# Patient Record
Sex: FEMALE | Race: WHITE | NOT HISPANIC OR LATINO | Employment: OTHER | URBAN - METROPOLITAN AREA
[De-identification: names, ages, dates, MRNs, and addresses within clinical notes are randomized per-mention and may not be internally consistent; named-entity substitution may affect disease eponyms.]

---

## 2017-09-19 ENCOUNTER — HOSPITAL ENCOUNTER (OUTPATIENT)
Dept: LAB | Facility: CLINIC | Age: 67
Discharge: HOME | End: 2017-09-19
Attending: FAMILY MEDICINE | Admitting: FAMILY MEDICINE
Payer: COMMERCIAL

## 2017-09-19 DIAGNOSIS — F41.9 ANXIETY: ICD-10-CM

## 2017-09-19 DIAGNOSIS — R53.83 FATIGUE: ICD-10-CM

## 2017-09-19 DIAGNOSIS — R10.9 STOMACH ACHE: Primary | ICD-10-CM

## 2017-09-19 DIAGNOSIS — K90.0 CELIAC DISEASE: ICD-10-CM

## 2017-09-19 DIAGNOSIS — R19.7 DIARRHEA: ICD-10-CM

## 2017-09-19 LAB
ALBUMIN SERPL-MCNC: 3.9 G/DL (ref 3.4–5)
ALP SERPL-CCNC: 68 U/L (ref 40–150)
ALT SERPL W P-5'-P-CCNC: 22 U/L (ref 0–50)
ANION GAP SERPL CALCULATED.3IONS-SCNC: 7 MMOL/L (ref 3–14)
AST SERPL W P-5'-P-CCNC: 19 U/L (ref 0–45)
BILIRUB SERPL-MCNC: 0.5 MG/DL (ref 0.2–1.3)
BUN SERPL-MCNC: 12 MG/DL (ref 7–30)
CALCIUM SERPL-MCNC: 9.1 MG/DL (ref 8.5–10.1)
CHLORIDE SERPL-SCNC: 107 MMOL/L (ref 94–109)
CO2 SERPL-SCNC: 27 MMOL/L (ref 20–32)
CREAT SERPL-MCNC: 0.71 MG/DL (ref 0.52–1.04)
CRP SERPL-MCNC: <2.9 MG/L (ref 0–8)
GFR SERPL CREATININE-BSD FRML MDRD: 81 ML/MIN/1.7M2
GGT SERPL-CCNC: 13 U/L (ref 0–40)
GLUCOSE SERPL-MCNC: 103 MG/DL (ref 70–99)
POTASSIUM SERPL-SCNC: 3.9 MMOL/L (ref 3.4–5.3)
PROT SERPL-MCNC: 8 G/DL (ref 6.8–8.8)
SODIUM SERPL-SCNC: 141 MMOL/L (ref 133–144)

## 2017-09-19 PROCEDURE — 36415 COLL VENOUS BLD VENIPUNCTURE: CPT | Performed by: FAMILY MEDICINE

## 2017-09-19 PROCEDURE — 82977 ASSAY OF GGT: CPT | Performed by: FAMILY MEDICINE

## 2017-09-19 PROCEDURE — 80053 COMPREHEN METABOLIC PANEL: CPT | Performed by: FAMILY MEDICINE

## 2017-09-19 PROCEDURE — 86140 C-REACTIVE PROTEIN: CPT | Performed by: FAMILY MEDICINE

## 2017-09-19 PROCEDURE — 83018 HEAVY METAL QUAN EACH NES: CPT | Performed by: FAMILY MEDICINE

## 2017-09-20 LAB — IODINE SERPL-MCNC: 61 NG/ML (ref 40–92)

## 2018-02-06 ENCOUNTER — SURGERY (OUTPATIENT)
Age: 68
End: 2018-02-06

## 2018-02-06 ENCOUNTER — HOSPITAL ENCOUNTER (OUTPATIENT)
Facility: CLINIC | Age: 68
Discharge: HOME | End: 2018-02-06
Attending: OPHTHALMOLOGY | Admitting: OPHTHALMOLOGY
Payer: COMMERCIAL

## 2018-02-06 ENCOUNTER — HOSPITAL ENCOUNTER (EMERGENCY)
Facility: CLINIC | Age: 68
Discharge: HOME | End: 2018-02-06
Attending: EMERGENCY MEDICINE | Admitting: EMERGENCY MEDICINE
Payer: COMMERCIAL

## 2018-02-06 VITALS
WEIGHT: 132 LBS | TEMPERATURE: 97.9 F | RESPIRATION RATE: 16 BRPM | HEIGHT: 65 IN | SYSTOLIC BLOOD PRESSURE: 149 MMHG | DIASTOLIC BLOOD PRESSURE: 76 MMHG | BODY MASS INDEX: 21.99 KG/M2 | OXYGEN SATURATION: 98 %

## 2018-02-06 DIAGNOSIS — H40.032 ANATOMICAL NARROW ANGLE BORDERLINE GLAUCOMA OF LEFT EYE: Primary | ICD-10-CM

## 2018-02-06 DIAGNOSIS — H40.211 ACUTE ANGLE-CLOSURE GLAUCOMA, RIGHT: Primary | ICD-10-CM

## 2018-02-06 DIAGNOSIS — H40.9 ACUTE GLAUCOMA OF RIGHT EYE: ICD-10-CM

## 2018-02-06 PROCEDURE — 25000128 H RX IP 250 OP 636: Performed by: EMERGENCY MEDICINE

## 2018-02-06 PROCEDURE — 36000104 ZZH EYE SURGERY LEVEL 4 1ST 30 MIN: Performed by: OPHTHALMOLOGY

## 2018-02-06 PROCEDURE — 96374 THER/PROPH/DIAG INJ IV PUSH: CPT

## 2018-02-06 PROCEDURE — 99284 EMERGENCY DEPT VISIT MOD MDM: CPT | Mod: 25

## 2018-02-06 PROCEDURE — 25000125 ZZHC RX 250: Performed by: OPHTHALMOLOGY

## 2018-02-06 PROCEDURE — 96375 TX/PRO/DX INJ NEW DRUG ADDON: CPT

## 2018-02-06 PROCEDURE — 40000169 ZZH STATISTIC PRE-PROCEDURE ASSESSMENT I: Performed by: OPHTHALMOLOGY

## 2018-02-06 PROCEDURE — 25000125 ZZHC RX 250: Performed by: EMERGENCY MEDICINE

## 2018-02-06 RX ORDER — PROPARACAINE HYDROCHLORIDE 5 MG/ML
2 SOLUTION/ DROPS OPHTHALMIC ONCE
Status: COMPLETED | OUTPATIENT
Start: 2018-02-06 | End: 2018-02-06

## 2018-02-06 RX ORDER — TIMOLOL MALEATE 5 MG/ML
1 SOLUTION/ DROPS OPHTHALMIC ONCE
Status: COMPLETED | OUTPATIENT
Start: 2018-02-06 | End: 2018-02-06

## 2018-02-06 RX ORDER — ONDANSETRON 2 MG/ML
4 INJECTION INTRAMUSCULAR; INTRAVENOUS ONCE
Status: COMPLETED | OUTPATIENT
Start: 2018-02-06 | End: 2018-02-06

## 2018-02-06 RX ORDER — PREDNISOLONE ACETATE 10 MG/ML
1 SUSPENSION/ DROPS OPHTHALMIC 4 TIMES DAILY
Qty: 1 BOTTLE | Refills: 0 | Status: SHIPPED | OUTPATIENT
Start: 2018-02-06 | End: 2022-08-01

## 2018-02-06 RX ORDER — PILOCARPINE HYDROCHLORIDE 20 MG/ML
1 SOLUTION/ DROPS OPHTHALMIC ONCE
Status: DISCONTINUED | OUTPATIENT
Start: 2018-02-06 | End: 2018-02-07 | Stop reason: HOSPADM

## 2018-02-06 RX ORDER — WATER 10 ML/10ML
INJECTION INTRAMUSCULAR; INTRAVENOUS; SUBCUTANEOUS
Status: DISCONTINUED
Start: 2018-02-06 | End: 2018-02-06 | Stop reason: HOSPADM

## 2018-02-06 RX ORDER — BRIMONIDINE TARTRATE 1 MG/ML
1 SOLUTION/ DROPS OPHTHALMIC 2 TIMES DAILY
Qty: 1 BOTTLE | Refills: 11 | Status: SHIPPED | OUTPATIENT
Start: 2018-02-06 | End: 2022-07-24

## 2018-02-06 RX ORDER — BRIMONIDINE TARTRATE 2 MG/ML
SOLUTION/ DROPS OPHTHALMIC PRN
Status: DISCONTINUED | OUTPATIENT
Start: 2018-02-06 | End: 2018-02-07 | Stop reason: HOSPADM

## 2018-02-06 RX ORDER — PROPARACAINE HYDROCHLORIDE 5 MG/ML
1 SOLUTION/ DROPS OPHTHALMIC ONCE
Status: DISCONTINUED | OUTPATIENT
Start: 2018-02-06 | End: 2018-02-07 | Stop reason: HOSPADM

## 2018-02-06 RX ORDER — ACETAZOLAMIDE 500 MG/5ML
500 INJECTION, POWDER, LYOPHILIZED, FOR SOLUTION INTRAVENOUS ONCE
Status: COMPLETED | OUTPATIENT
Start: 2018-02-06 | End: 2018-02-06

## 2018-02-06 RX ORDER — PILOCARPINE HYDROCHLORIDE 20 MG/ML
1 SOLUTION/ DROPS OPHTHALMIC ONCE
Status: COMPLETED | OUTPATIENT
Start: 2018-02-06 | End: 2018-02-06

## 2018-02-06 RX ADMIN — PILOCARPINE HYDROCHLORIDE 1 DROP: 20 SOLUTION/ DROPS OPHTHALMIC at 08:59

## 2018-02-06 RX ADMIN — ACETAZOLAMIDE 500 MG: 500 INJECTION, POWDER, LYOPHILIZED, FOR SOLUTION INTRAVENOUS at 09:04

## 2018-02-06 RX ADMIN — BRIMONIDINE TARTRATE 1 DROP: 2 SOLUTION/ DROPS OPHTHALMIC at 13:05

## 2018-02-06 RX ADMIN — APRACLONIDINE HYDROCHLORIDE 1 DROP: 10 SOLUTION/ DROPS OPHTHALMIC at 08:57

## 2018-02-06 RX ADMIN — PROPARACAINE HYDROCHLORIDE 2 DROP: 5 SOLUTION/ DROPS OPHTHALMIC at 08:29

## 2018-02-06 RX ADMIN — PILOCARPINE HYDROCHLORIDE 1 DROP: 20 SOLUTION/ DROPS OPHTHALMIC at 09:16

## 2018-02-06 RX ADMIN — TIMOLOL MALEATE 1 DROP: 5 SOLUTION OPHTHALMIC at 08:55

## 2018-02-06 RX ADMIN — ONDANSETRON 4 MG: 2 INJECTION INTRAMUSCULAR; INTRAVENOUS at 09:08

## 2018-02-06 ASSESSMENT — ENCOUNTER SYMPTOMS
CHILLS: 0
FEVER: 0
NAUSEA: 0
DIZZINESS: 0
COUGH: 0
NUMBNESS: 0
FATIGUE: 0
PHOTOPHOBIA: 0
LIGHT-HEADEDNESS: 0
SEIZURES: 0
WEAKNESS: 0
HEADACHES: 1
SHORTNESS OF BREATH: 0
SPEECH DIFFICULTY: 0

## 2018-02-06 ASSESSMENT — VISUAL ACUITY
OD: 20/50
OS: 20/30

## 2018-02-06 NOTE — DISCHARGE SUMMARY
Patient presented to ED with pressure of 82 and an acute angle in the right eye.  After IV Diamox, and several rounds of IOP lowering drops, the IOP reduced to 18.  She has not prior history of reported angle closure or of any prior surgery in either eye.  It was determined that she should have an laser peripheral iridotomy (LPI) OD now and likely an laser peripheral iridotomy (LPI) OS in the future  Risks/benefits/alternatives discussed  Patient wishes to proceed with laser peripheral iridotomy (LPI) OD now    laser peripheral iridotomy (LPI) performed right eye    43 shots  1.8 mJ  74 mJ total energy    Start prednisolone FOUR TIMES A DAY x 1 week, twice a day x 1 week, once a day x 1 week  Start Brimonidine twice a day x 1 week  Start Timolol every morning x 1 week    Patient to follow up here at [facility] for exam of right eye and likely laser peripheral iridotomy (LPI) OS

## 2018-02-06 NOTE — ED AVS SNAPSHOT
Emergency Department    [address] [place] [address]    Phone:  [phone]    Fax:  [phone]                                       Macie Maya   MRN: [id number]    Department:   Emergency Department   Date of Visit:  2/6/2018           After Visit Summary Signature Page     I have received my discharge instructions, and my questions have been answered. I have discussed any challenges I see with this plan with the nurse or doctor.    ..........................................................................................................................................  Patient/Patient Representative Signature      ..........................................................................................................................................  Patient Representative Print Name and Relationship to Patient    ..................................................               ................................................  Date                                            Time    ..........................................................................................................................................  Reviewed by Signature/Title    ...................................................              ..............................................  Date                                                            Time

## 2018-02-06 NOTE — ED AVS SNAPSHOT
Emergency Department    [address] [facility] [address]    Phone:  [phone]    Fax:  [phone]                                       Macie Maya   MRN: [id number]    Department:   Emergency Department   Date of Visit:  2/6/2018           Patient Information     Date Of Birth          1950        Your diagnoses for this visit were:     Acute glaucoma of right eye        You were seen by Jami Menezes MD.      Follow-up Information     Follow up with Jose Cleveland MD.    Specialty:  Ophthalmology    Why:  [facility] in the [facility] for [facility]    Contact information:    [address] [facility] [address] [phone]        Discharge References/Attachments     GLAUCOMA, NARROW-ANGLE (ACUTE) (ENGLISH)      24 Hour Appointment Hotline       To make an appointment at any [facility], call [phone] ([phone]). If you don't have a family doctor or clinic, we will help you find one. [facility] are conveniently located to serve the needs of you and your family.             Review of your medicines      Notice     You have not been prescribed any medications.            Procedures and tests performed during your visit     Visual Acuity      Orders Needing Specimen Collection     None      Pending Results     No orders found from 2/4/2018 to 2/7/2018.            Pending Culture Results     No orders found from 2/4/2018 to 2/7/2018.            Pending Results Instructions     If you had any lab results that were not finalized at the time of your Discharge, you can call the ED Lab Result RN at [phone]. You will be contacted by this team for any positive Lab results or changes in treatment. The nurses are available 7 days a week from 10A to 6:30P.  You can leave a message 24 hours per day and they will return your call.        Test Results From Your Hospital Stay               Clinical Quality Measure: Blood Pressure Screening     Your  blood pressure was checked while you were in the emergency department today. The last reading we obtained was  BP: 149/76 (Simultaneous filing. User may not have seen previous data.) . Please read the guidelines below about what these numbers mean and what you should do about them.  If your systolic blood pressure (the top number) is less than 120 and your diastolic blood pressure (the bottom number) is less than 80, then your blood pressure is normal. There is nothing more that you need to do about it.  If your systolic blood pressure (the top number) is 120-139 or your diastolic blood pressure (the bottom number) is 80-89, your blood pressure may be higher than it should be. You should have your blood pressure rechecked within a year by a primary care provider.  If your systolic blood pressure (the top number) is 140 or greater or your diastolic blood pressure (the bottom number) is 90 or greater, you may have high blood pressure. High blood pressure is treatable, but if left untreated over time it can put you at risk for heart attack, stroke, or kidney failure. You should have your blood pressure rechecked by a primary care provider within the next 4 weeks.  If your provider in the emergency department today gave you specific instructions to follow-up with your doctor or provider even sooner than that, you should follow that instruction and not wait for up to 4 weeks for your follow-up visit.        Thank you for choosing [facility]       Thank you for choosing [facility] for your care. Our goal is always to provide you with excellent care. Hearing back from our patients is one way we can continue to improve our services. Please take a few minutes to complete the written survey that you may receive in the mail after you visit with us. Thank you!        [facility] Information     [facility] lets you send messages to your doctor, view your test results, renew your prescriptions, schedule appointments and more. To sign up, go  "to [email or web address] . Click on \"Log in\" on the left side of the screen, which will take you to the Welcome page. Then click on \"Sign up Now\" on the right side of the page.     You will be asked to enter the access code listed below, as well as some personal information. Please follow the directions to create your username and password.     Your access code is: [email or web address]  Expires: 2018 10:52 AM     Your access code will  in 90 days. If you need help or a new code, please call your [facility] clinic or [phone].        Care EveryWhere ID     This is your Care EveryWhere ID. This could be used by other organizations to access your [facility] medical records  [id number]        Equal Access to Services     ESTUARDO BE : Jesús Cordova, wavipul griffin, peyton valeraalsheela headley, twan hernández. So [facility] [phone].    ATENCIÓN: Si habla español, tiene a barrera disposición servicios gratuitos de asistencia lingüística. Jayla al [phone].    We comply with applicable federal civil rights laws and [place] laws. We do not discriminate on the basis of race, color, national origin, age, disability, sex, sexual orientation, or gender identity.            After Visit Summary       This is your record. Keep this with you and show to your community pharmacist(s) and doctor(s) at your next visit.                  "

## 2018-02-06 NOTE — OP NOTE
PROCEDURE NOTE:    Patient Name: Macie Maya  Date: February 6, 2018    Pre-op Dx:   1. Acute angle closure glaucoma OD  2. Anatomic narrow angles OU  Post-op Dx: Same    Procedure: Emergent laser peripheral iridotomy OD  Surgeon: Jose Medina MD  Assistant: Pernell Moreno DO    Indications:  The patient is a 68yo  Female with a history of cataracts and anatomic narrow angles. She presented to the ER with severe ocular pain OD acutely, associated with headache, nausea, blurry vision. She was diagnosed with acute angle closure glaucoma and started on Diamox IV and topical glaucoma drops. Her pressure improved and she was urgently sent to the eye center for immediate placement of a laser peripheral iridotomy in the right eye. The risks, benefits, and alternatives of the procedure were discussed extensively with the patient. Informed consent was obtained.     Procedure:  After discussion of the risks, benefits, and alternatives of the procedure, informed consent was obtained. The appropriate eye was marked and the patient was brought to the laser room and placed in an appropriate position at the YAG laser. Several drops of topical tetracaine were placed in the right eye to numb the eye. Goniosol was then placed in a Sreedhar iridotomy lens and the lens was placed on the right eye. A peripheral iridotomy was created at ~9:30 with the YAG laser without complications. The laser settings are listed below.     Power: 1.7-2.0mJ  Total shots: 43  Total power: 72.3mJ  Post-op IOP: 9 mmHg    The patient tolerated the procedure well. She was discharged home and instructed to start prednisone QID OD x 1week, BID x 1week, daily x 1week, then STOP. She was also started on Brimonidine BID OD x 1week and Timolol qAM x 1week. The patient was instructed to follow-up in 1 month for LPI OS.    ~~~~~~~~~~~~~~~~~~~~~~~~~~~~~~~~~~~~~~~~~~~~~~~~~~~~~~~~~~~~~~~~    Complete documentation of historical and exam elements from  today's encounter can be found in the full encounter summary report (not reduplicated in this progress note). I personally obtained the chief complaint(s) and history of present illness.  I confirmed and edited as necessary the review of systems, past medical/surgical history, family history, social history, and examination findings as documented by others; and I examined the patient myself. I personally reviewed the relevant tests, images, and reports as documented above. I formulated and edited as necessary the assessment and plan and discussed the findings and management plan with the patient and family.    I was present for the entire procedure.    Jose Medina MD

## 2018-02-06 NOTE — ED PROVIDER NOTES
History     Chief Complaint:  Eye Problem    HPI   Macie Maya is a 67 year old female who presents with an eye problem. The patient states that she woke up this morning at approximately 0530 and started experiencing visual disturbances in her right eye. She states that the vision in her right eye has become blurry/cloudy and that her peripheral vision in this eye has decreased somewhat/become distorted. She states that her vision in her left eye is normal and unchanged. She is also having some right eye pain along with possible sinus pain/pressure. She is concerned for possible glaucoma and has no history of strokes or any other significant pathologies. She denies any cough, fevers, chills, numbness, weakness, facial droop, speech complications, chest pain, shortness of breath, or any other symptoms.    Allergies:  Gluten Meal     Medications:    The patient is not currently taking any prescribed medications.     Past Medical History:    Celiac disease    Past Surgical History:    No pertinent past surgical history.    Family History:    Mother-hypertension  Grandmother-stroke    Social History:  Smoking status: Never smoker  Alcohol use: No  Marital Status:        Review of Systems   Constitutional: Negative for chills, fatigue and fever.   Eyes: Positive for visual disturbance. Negative for photophobia.   Respiratory: Negative for cough and shortness of breath.    Gastrointestinal: Negative for nausea.   Neurological: Positive for headaches. Negative for dizziness, seizures, syncope, speech difficulty, weakness, light-headedness and numbness.   All other systems reviewed and are negative.    Physical Exam     Patient Vitals for the past 24 hrs:   BP Temp Temp src Heart Rate Resp SpO2 Height Weight   02/06/18 0916 - - - - - 98 % - -   02/06/18 0915 149/76 - - - - - - -   02/06/18 0900 - - - - - 100 % - -   02/06/18 0859 140/69 - - - - - - -   02/06/18 0843 - - - - - 99 % - -   02/06/18 0842 - - -  "- - 99 % - -   02/06/18 0841 139/74 - - - - - - -   02/06/18 0803 (!) 173/96 - - - - (!) 69 % - -   02/06/18 0800 172/89 97.9  F (36.6  C) Oral 102 16 99 % 1.651 m (5' 5\") 59.9 kg (132 lb)       Visual Acuity-Left: 20/30  Visual Acuity-Right: 20/50      Physical Exam  Nursing note and vitals reviewed.  Constitutional:  Appears well-developed and well-nourished.   HENT:   Head:    Atraumatic.   Mouth/Throat:   Oropharynx is clear and moist. No oropharyngeal exudate.   Eyes:    Pupils are equal, round, and reactive to light.  Unable to well visualize fundi.  Normal visual field testing bilateral eyes.  Neck:    Normal range of motion. Neck supple.      No tracheal deviation present. No thyromegaly present.   Cardiovascular:  Normal rate, regular rhythm, no murmur   Pulmonary/Chest: Breath sounds are clear and equal without wheezes or crackles.  Abdominal:   Soft. Bowel sounds are normal. Exhibits no distension and      no mass. There is no tenderness.      There is no rebound and no guarding.   Musculoskeletal:  Exhibits no edema.   Lymphadenopathy:  No cervical adenopathy.   Neurological:   GCS 15.  CN 2-12 intact.  and proximal upper extremity strength strong and equal.  Bilateral lower extremity strength strong and equal, including strong dorsiflexion and plantarflexion strength.  Sensation intact and equal to the face, arms and legs.  No facial droop or weakness. Normal speech.  Follows commands and answers questions normally.    Skin:    Skin is warm and dry. No rash noted. No pallor.     Emergency Department Course   Interventions:  (0829) Proparacaine 0.5% ophthalmic solution, 2 drops  (0855) Timolol 0.5% ophthalmic solution, 1 drop  (0857) Apraclonidine 1% ophthalmic solution, 1 drop  (0904) Acetazolamide 500 mg, IV Injection  (0908) Zofran, 4 mg, IV injection  (0916) Pilocarpine 2% ophthalmic solution, 2 drops    Emergency Department Course:  Nursing notes and vitals reviewed.  (0815) I performed an exam " of the patient as documented above.    Patient was taken to eye [place] for further examination and treatment by Dr. Moreno of ophthalmology.     I spoke once more with Dr. Cleveland about the patient's exam and the patient was found to have acute right eye glaucoma. Patient will be discharged from the ED into immediate care of the eye center for surgery later today to be performed by one of his partners, Dr. Medina, in tandem with Dr. Moreno.    Findings and plan explained to the patient. Patient discharged home with instructions regarding supportive care, medications, and reasons to return. The importance of close follow-up was reviewed.  Impression & Plan    Medical Decision Making:  Macie Maya is a 67 year old female who presented with an eye problem. I found the patient to have acute near angle glaucoma as the cause for her symptoms. Therefore I initiated treatment with eye drops topically and acetazolamide IV. I called ophthalmology initially but there was delay in them returning my calls so I had to initiate treatment emergently because the patient stated that her vision changes were starting to worsen. She was given topical eye drops as stated above and acetazolamide 500 mg IV and had improvement of her vision and eye pressure down to 64 from the initial 82. I eventually spoke with Dr. Demetrio Cleveland, the ophthalmologist on call, and just prior to that, Dr. Wade Moreno agreed to let me send the patient over to the eye center next door for them to evaluate her. He reported that her eye pressures had returned to normal and recommended PI surgery. I spoke with Dr. Jose Cleveland again and arranged for one of his partners, Dr. Medina, and the ophthalmology fellow, Dr. Moreno, to perform the surgery emergently here at [facility] in the eye [place]. The patient was discharged at the eye center so the patient could have her surgery. I also discussed with the patient her high blood pressure here. She states she  has never known about having hypertension and has never been officially diagnosed with this. She is advised to follow up with clinic within the next week to have her blood pressure rechecked. This was discussed with her and her  and they were alda agreement. I did not feel that there was any sign of stroke or intracranial bleed.    Diagnosis:    ICD-10-CM   1. Acute glaucoma of right eye H40.9       Disposition:  Patient is discharged to home.            I, Rosendo Noble, am serving as a scribe on 2/6/2018 at 8:15 AM to personally document services performed by Dr. Menezes based on my observations and the provider's statements to me.          Rosendo Noble  2/6/2018    EMERGENCY DEPARTMENT       Jami Menezes MD  02/06/18 8004       Jami Menezes MD  02/06/18 8379

## 2018-02-08 ENCOUNTER — TELEPHONE (OUTPATIENT)
Dept: OPHTHALMOLOGY | Facility: CLINIC | Age: 68
End: 2018-02-08

## 2018-02-09 ENCOUNTER — TELEPHONE (OUTPATIENT)
Dept: OPHTHALMOLOGY | Facility: CLINIC | Age: 68
End: 2018-02-09

## 2018-03-06 ENCOUNTER — SURGERY (OUTPATIENT)
Age: 68
End: 2018-03-06

## 2018-03-06 ENCOUNTER — HOSPITAL ENCOUNTER (OUTPATIENT)
Facility: CLINIC | Age: 68
Discharge: HOME | End: 2018-03-06
Attending: OPHTHALMOLOGY | Admitting: OPHTHALMOLOGY
Payer: COMMERCIAL

## 2018-03-06 ENCOUNTER — HOSPITAL ENCOUNTER (EMERGENCY)
Facility: CLINIC | Age: 68
Discharge: HOME | End: 2018-03-06
Attending: EMERGENCY MEDICINE | Admitting: EMERGENCY MEDICINE
Payer: COMMERCIAL

## 2018-03-06 VITALS
OXYGEN SATURATION: 98 % | SYSTOLIC BLOOD PRESSURE: 154 MMHG | DIASTOLIC BLOOD PRESSURE: 86 MMHG | RESPIRATION RATE: 16 BRPM | TEMPERATURE: 97.6 F

## 2018-03-06 VITALS
BODY MASS INDEX: 23.05 KG/M2 | TEMPERATURE: 97.9 F | DIASTOLIC BLOOD PRESSURE: 76 MMHG | WEIGHT: 135 LBS | SYSTOLIC BLOOD PRESSURE: 130 MMHG | OXYGEN SATURATION: 99 % | RESPIRATION RATE: 16 BRPM | HEIGHT: 64 IN | HEART RATE: 64 BPM

## 2018-03-06 DIAGNOSIS — H40.032 ANATOMICAL NARROW ANGLE BORDERLINE GLAUCOMA, LEFT: Primary | ICD-10-CM

## 2018-03-06 DIAGNOSIS — R55 VASOVAGAL SYNCOPE: ICD-10-CM

## 2018-03-06 LAB
ALBUMIN SERPL-MCNC: 3.9 G/DL (ref 3.4–5)
ALP SERPL-CCNC: 63 U/L (ref 40–150)
ALT SERPL W P-5'-P-CCNC: 22 U/L (ref 0–50)
ANION GAP SERPL CALCULATED.3IONS-SCNC: 9 MMOL/L (ref 3–14)
AST SERPL W P-5'-P-CCNC: 16 U/L (ref 0–45)
BASOPHILS # BLD AUTO: 0 10E9/L (ref 0–0.2)
BASOPHILS NFR BLD AUTO: 0.6 %
BILIRUB SERPL-MCNC: 0.4 MG/DL (ref 0.2–1.3)
BUN SERPL-MCNC: 22 MG/DL (ref 7–30)
CALCIUM SERPL-MCNC: 8.7 MG/DL (ref 8.5–10.1)
CHLORIDE SERPL-SCNC: 108 MMOL/L (ref 94–109)
CO2 SERPL-SCNC: 23 MMOL/L (ref 20–32)
CREAT SERPL-MCNC: 0.76 MG/DL (ref 0.52–1.04)
DIFFERENTIAL METHOD BLD: NORMAL
EOSINOPHIL # BLD AUTO: 0.1 10E9/L (ref 0–0.7)
EOSINOPHIL NFR BLD AUTO: 0.8 %
ERYTHROCYTE [DISTWIDTH] IN BLOOD BY AUTOMATED COUNT: 13.1 % (ref 10–15)
GFR SERPL CREATININE-BSD FRML MDRD: 75 ML/MIN/1.7M2
GLUCOSE SERPL-MCNC: 126 MG/DL (ref 70–99)
HCT VFR BLD AUTO: 35.3 % (ref 35–47)
HGB BLD-MCNC: 12.1 G/DL (ref 11.7–15.7)
IMM GRANULOCYTES # BLD: 0 10E9/L (ref 0–0.4)
IMM GRANULOCYTES NFR BLD: 0.5 %
INTERPRETATION ECG - MUSE: NORMAL
LYMPHOCYTES # BLD AUTO: 2.7 10E9/L (ref 0.8–5.3)
LYMPHOCYTES NFR BLD AUTO: 42.2 %
MCH RBC QN AUTO: 30.4 PG (ref 26.5–33)
MCHC RBC AUTO-ENTMCNC: 34.3 G/DL (ref 31.5–36.5)
MCV RBC AUTO: 89 FL (ref 78–100)
MONOCYTES # BLD AUTO: 0.4 10E9/L (ref 0–1.3)
MONOCYTES NFR BLD AUTO: 6 %
NEUTROPHILS # BLD AUTO: 3.3 10E9/L (ref 1.6–8.3)
NEUTROPHILS NFR BLD AUTO: 49.9 %
NRBC # BLD AUTO: 0 10*3/UL
NRBC BLD AUTO-RTO: 0 /100
PLATELET # BLD AUTO: 232 10E9/L (ref 150–450)
POTASSIUM SERPL-SCNC: 3.8 MMOL/L (ref 3.4–5.3)
PROT SERPL-MCNC: 7.6 G/DL (ref 6.8–8.8)
RBC # BLD AUTO: 3.98 10E12/L (ref 3.8–5.2)
SODIUM SERPL-SCNC: 140 MMOL/L (ref 133–144)
TROPONIN I SERPL-MCNC: <0.015 UG/L (ref 0–0.04)
WBC # BLD AUTO: 6.5 10E9/L (ref 4–11)

## 2018-03-06 PROCEDURE — 85025 COMPLETE CBC W/AUTO DIFF WBC: CPT | Performed by: EMERGENCY MEDICINE

## 2018-03-06 PROCEDURE — 99284 EMERGENCY DEPT VISIT MOD MDM: CPT | Mod: 25

## 2018-03-06 PROCEDURE — 36000104 ZZH EYE SURGERY LEVEL 4 1ST 30 MIN: Performed by: OPHTHALMOLOGY

## 2018-03-06 PROCEDURE — 84484 ASSAY OF TROPONIN QUANT: CPT | Performed by: EMERGENCY MEDICINE

## 2018-03-06 PROCEDURE — 93005 ELECTROCARDIOGRAM TRACING: CPT

## 2018-03-06 PROCEDURE — 80053 COMPREHEN METABOLIC PANEL: CPT | Performed by: EMERGENCY MEDICINE

## 2018-03-06 PROCEDURE — 40000170 ZZH STATISTIC PRE-PROCEDURE ASSESSMENT II: Performed by: OPHTHALMOLOGY

## 2018-03-06 PROCEDURE — 25000125 ZZHC RX 250: Performed by: OPHTHALMOLOGY

## 2018-03-06 RX ORDER — PILOCARPINE HYDROCHLORIDE 20 MG/ML
1 SOLUTION/ DROPS OPHTHALMIC ONCE
Status: COMPLETED | OUTPATIENT
Start: 2018-03-06 | End: 2018-03-06

## 2018-03-06 RX ADMIN — PILOCARPINE HYDROCHLORIDE 1 DROP: 20 SOLUTION/ DROPS OPHTHALMIC at 10:24

## 2018-03-06 RX ADMIN — APRACLONIDINE HYDROCHLORIDE 1 DROP: 10 SOLUTION/ DROPS OPHTHALMIC at 10:24

## 2018-03-06 ASSESSMENT — ENCOUNTER SYMPTOMS
SHORTNESS OF BREATH: 0
HEADACHES: 0

## 2018-03-06 NOTE — ED AVS SNAPSHOT
Emergency Department    [address] [facility] [address]    Phone:  [phone]    Fax:  [phone]                                       Macie Maya   MRN: [id number]    Department:   Emergency Department   Date of Visit:  3/6/2018           Patient Information     Date Of Birth          1950        Your diagnoses for this visit were:     Vasovagal syncope        You were seen by Morgan Fernandez MD.      Follow-up Information     Follow up with Your Primary Care Doctor In 5 days.        Follow up with  Emergency Department.    Specialty:  EMERGENCY MEDICINE    Why:  As needed, If symptoms worsen    Contact information:    [address] [facility] [address] [phone]        Discharge Instructions         Fainting: Vagal Reaction  Fainting (syncope) is a temporary loss of consciousness that is associated with a loss of postural tone. It s also called passing out. It occurs when blood flow to the brain is less than normal. Your healthcare provider believes that your fainting was because of a vagal reaction. This condition is not a sign of serious disease.  A vagal reaction is a response in your body that causes your pulse to slow down or the blood vessels to expand. This causes your blood pressure to fall. And this sends less blood to your brain if you are standing or sitting. That results in dizziness, near-fainting, or fainting. Lying down usually stops the reaction within 60 seconds.  This response can occur during sudden fear, severe pain, emotional stress, overexertion, overheating, hunger, nausea or vomiting, prolonged standing, or standing up after sitting or lying for a long time.  Home care  Follow these guidelines when caring for yourself at home:    Rest today. Go back to your normal activities as soon as you are feeling back to normal.    Stay hydrated and avoid skipping meals.    If you feel lightheaded or dizzy, lie down right away. Or sit  with your head lowered between your knees.  Follow-up care  Follow up with your healthcare provider, or as advised.  When to seek medical advice  Call your healthcare provider right away if any of these occur:    Another fainting spell that s not explained by the common causes listed above    Pain in your chest, arm, neck, jaw, back, or abdomen    Shortness of breath    Severe headache or seizure    Your heart beats very rapidly, very slowly, or irregularly (palpitations)  Date Last Reviewed: 12/1/2016 2000-2017 [employer]. [address] [place] [address]. All rights reserved. This information is not intended as a substitute for professional medical care. Always follow your healthcare professional's instructions.          Future Appointments        Provider Department Dept Phone Center    3/19/2018 10:15 AM Jose Medina MD [facility] [phone] [facility]      24 Hour Appointment Hotline       To make an appointment at any [facility], call [phone] ([phone]). If you don't have a family doctor or clinic, we will help you find one. [facility] are conveniently located to serve the needs of you and your family.             Review of your medicines      Our records show that you are taking the medicines listed below. If these are incorrect, please call your family doctor or clinic.        Dose / Directions Last dose taken    brimonidine 0.1 % ophthalmic solution   Commonly known as:  ALPHAGAN P   Dose:  1 drop   Quantity:  1 Bottle        Place 1 drop into the right eye 2 times daily   Refills:  11        prednisoLONE acetate 1 % ophthalmic susp   Commonly known as:  PRED FORTE   Dose:  1 drop   Quantity:  1 Bottle        Place 1 drop into the right eye 4 times daily   Refills:  0                Procedures and tests performed during your visit     CBC with platelets differential    Comprehensive metabolic panel    EKG 12 lead    Troponin I      Orders Needing  Specimen Collection     None      Pending Results     No orders found from 3/4/2018 to 3/7/2018.            Pending Culture Results     No orders found from 3/4/2018 to 3/7/2018.            Pending Results Instructions     If you had any lab results that were not finalized at the time of your Discharge, you can call the ED Lab Result RN at [phone]. You will be contacted by this team for any positive Lab results or changes in treatment. The nurses are available 7 days a week from 10A to 6:30P.  You can leave a message 24 hours per day and they will return your call.        Test Results From Your Hospital Stay        3/6/2018 12:09 PM      Component Results     Component Value Ref Range & Units Status    WBC 6.5 4.0 - 11.0 10e9/L Final    RBC Count 3.98 3.8 - 5.2 10e12/L Final    Hemoglobin 12.1 11.7 - 15.7 g/dL Final    Hematocrit 35.3 35.0 - 47.0 % Final    MCV 89 78 - 100 fl Final    MCH 30.4 26.5 - 33.0 pg Final    MCHC 34.3 31.5 - 36.5 g/dL Final    RDW 13.1 10.0 - 15.0 % Final    Platelet Count 232 150 - 450 10e9/L Final    Diff Method Automated Method  Final    % Neutrophils 49.9 % Final    % Lymphocytes 42.2 % Final    % Monocytes 6.0 % Final    % Eosinophils 0.8 % Final    % Basophils 0.6 % Final    % Immature Granulocytes 0.5 % Final    Nucleated RBCs 0 0 /100 Final    Absolute Neutrophil 3.3 1.6 - 8.3 10e9/L Final    Absolute Lymphocytes 2.7 0.8 - 5.3 10e9/L Final    Absolute Monocytes 0.4 0.0 - 1.3 10e9/L Final    Absolute Eosinophils 0.1 0.0 - 0.7 10e9/L Final    Absolute Basophils 0.0 0.0 - 0.2 10e9/L Final    Abs Immature Granulocytes 0.0 0 - 0.4 10e9/L Final    Absolute Nucleated RBC 0.0  Final         3/6/2018 12:25 PM      Component Results     Component Value Ref Range & Units Status    Sodium 140 133 - 144 mmol/L Final    Potassium 3.8 3.4 - 5.3 mmol/L Final    Chloride 108 94 - 109 mmol/L Final    Carbon Dioxide 23 20 - 32 mmol/L Final    Anion Gap 9 3 - 14 mmol/L Final    Glucose 126 (H) 70  - 99 mg/dL Final    Urea Nitrogen 22 7 - 30 mg/dL Final    Creatinine 0.76 0.52 - 1.04 mg/dL Final    GFR Estimate 75 >60 mL/min/1.7m2 Final    Non  GFR Calc    GFR Estimate If Black >90 >60 mL/min/1.7m2 Final    African American GFR Calc    Calcium 8.7 8.5 - 10.1 mg/dL Final    Bilirubin Total 0.4 0.2 - 1.3 mg/dL Final    Albumin 3.9 3.4 - 5.0 g/dL Final    Protein Total 7.6 6.8 - 8.8 g/dL Final    Alkaline Phosphatase 63 40 - 150 U/L Final    ALT 22 0 - 50 U/L Final    AST 16 0 - 45 U/L Final         3/6/2018 12:58 PM      Component Results     Component Value Ref Range & Units Status    Troponin I ES <0.015 0.000 - 0.045 ug/L Final    The 99th percentile for upper reference range is 0.045 ug/L.  Troponin values   in the range of 0.045 - 0.120 ug/L may be associated with risks of adverse   clinical events.                  Clinical Quality Measure: Blood Pressure Screening     Your blood pressure was checked while you were in the emergency department today. The last reading we obtained was  BP: 130/76 . Please read the guidelines below about what these numbers mean and what you should do about them.  If your systolic blood pressure (the top number) is less than 120 and your diastolic blood pressure (the bottom number) is less than 80, then your blood pressure is normal. There is nothing more that you need to do about it.  If your systolic blood pressure (the top number) is 120-139 or your diastolic blood pressure (the bottom number) is 80-89, your blood pressure may be higher than it should be. You should have your blood pressure rechecked within a year by a primary care provider.  If your systolic blood pressure (the top number) is 140 or greater or your diastolic blood pressure (the bottom number) is 90 or greater, you may have high blood pressure. High blood pressure is treatable, but if left untreated over time it can put you at risk for heart attack, stroke, or kidney failure. You should have  "your blood pressure rechecked by a primary care provider within the next 4 weeks.  If your provider in the emergency department today gave you specific instructions to follow-up with your doctor or provider even sooner than that, you should follow that instruction and not wait for up to 4 weeks for your follow-up visit.        Thank you for choosing [facility]       Thank you for choosing [facility] for your care. Our goal is always to provide you with excellent care. Hearing back from our patients is one way we can continue to improve our services. Please take a few minutes to complete the written survey that you may receive in the mail after you visit with us. Thank you!        The [facility] Information     [employer] lets you send messages to your doctor, view your test results, renew your prescriptions, schedule appointments and more. To sign up, go to [email or web address] . Click on \"Log in\" on the left side of the screen, which will take you to the Welcome page. Then click on \"Sign up Now\" on the right side of the page.     You will be asked to enter the access code listed below, as well as some personal information. Please follow the directions to create your username and password.     Your access code is: [email or web address]  Expires: 2018 10:52 AM     Your access code will  in 90 days. If you need help or a new code, please call your [facility] clinic or [phone].        Care EveryWhere ID     This is your Care EveryWhere ID. This could be used by other organizations to access your [facility] medical records  [id number]        Equal Access to Services     ESTUARDO BE : Hadii aad ku hadasho Soomaali, waaxda luqadaha, qaybta kaalmada adeegyada, twan schumacher . So [facility] [phone].    ATENCIÓN: Si habla español, tiene a barrera disposición servicios gratuitos de asistencia lingüística. Llame al [phone].    We comply with applicable federal civil rights laws and [place] laws. We do not " discriminate on the basis of race, color, national origin, age, disability, sex, sexual orientation, or gender identity.            After Visit Summary       This is your record. Keep this with you and show to your community pharmacist(s) and doctor(s) at your next visit.

## 2018-03-06 NOTE — OP NOTE
PROCEDURE NOTE:     Patient Name: Macie Maya  Date: March 6, 2018     Pre-op Dx:   1. Acute angle closure glaucoma OD  2. Anatomic narrow angles OU  Post-op Dx: Same     Procedure:   1. Repeat laser peripheral iridotomy OD    Surgeon: Jose Medina MD  Assistant: Pernell Moreno DO     Indications:  The patient is a 68yo  Female with a history of cataracts and anatomic narrow angles. She previously underwent emergent laser peripheral iridotomy OD for acute angle closure glaucoma. She presented for follow-up and was noted to have very shallow AC over the PI (I/K touch) with unclear patency. The decision was made to repeat the LPI OD and proceed with prophylactic LPI OS. The risks, benefits, and alternatives of the procedure were discussed extensively with the patient. Informed consent was obtained.      Procedure:  After discussion of the risks, benefits, and alternatives of the procedure, informed consent was obtained. The appropriate eye was marked and the patient was brought to the laser room and placed in an appropriate position at the YAG laser. Several drops of topical tetracaine were placed in the right eye to numb the eye. Goniosol was then placed in a Sreedhar iridotomy lens and the lens was placed on the right eye. A peripheral iridotomy was created at ~9:30 with the YAG laser without complications. The laser settings are listed below.      Right eye:  Power: 2.6-3.4mJ  Total shots: 60  Total power: 250 mJ  Post-op IOP: soft     The patient developed a vagal response and fainted after the first YAG laser. The prophylactic LPI OS was aborted. Patient's vitals were stable, SpO2 98% HR 56, /74. She was reclined and monitor for 30min with stable vitals. She was discharged home and told to follow-up at the [facility] for subsequent laser. She was also instructed to start prednisone QID OD x 1week, BID x 1week, daily x 1week, then STOP. She was also started on Brimonidine BID OD x  1week and Timolol qAM x 1week. The patient was instructed to follow-up in 2 weeks for LPI OS.     ~~~~~~~~~~~~~~~~~~~~~~~~~~~~~~~~~~~~~~~~~~~~~~~~~~~~~~~~~~~~~~~~     Complete documentation of historical and exam elements from today's encounter can be found in the full encounter summary report (not reduplicated in this progress note). I personally obtained the chief complaint(s) and history of present illness.  I confirmed and edited as necessary the review of systems, past medical/surgical history, family history, social history, and examination findings as documented by others; and I examined the patient myself. I personally reviewed the relevant tests, images, and reports as documented above. I formulated and edited as necessary the assessment and plan and discussed the findings and management plan with the patient and family.     I was present for the entire procedure.     Jose Medina MD

## 2018-03-06 NOTE — DISCHARGE INSTRUCTIONS
Fainting: Vagal Reaction  Fainting (syncope) is a temporary loss of consciousness that is associated with a loss of postural tone. It s also called passing out. It occurs when blood flow to the brain is less than normal. Your healthcare provider believes that your fainting was because of a vagal reaction. This condition is not a sign of serious disease.  A vagal reaction is a response in your body that causes your pulse to slow down or the blood vessels to expand. This causes your blood pressure to fall. And this sends less blood to your brain if you are standing or sitting. That results in dizziness, near-fainting, or fainting. Lying down usually stops the reaction within 60 seconds.  This response can occur during sudden fear, severe pain, emotional stress, overexertion, overheating, hunger, nausea or vomiting, prolonged standing, or standing up after sitting or lying for a long time.  Home care  Follow these guidelines when caring for yourself at home:    Rest today. Go back to your normal activities as soon as you are feeling back to normal.    Stay hydrated and avoid skipping meals.    If you feel lightheaded or dizzy, lie down right away. Or sit with your head lowered between your knees.  Follow-up care  Follow up with your healthcare provider, or as advised.  When to seek medical advice  Call your healthcare provider right away if any of these occur:    Another fainting spell that s not explained by the common causes listed above    Pain in your chest, arm, neck, jaw, back, or abdomen    Shortness of breath    Severe headache or seizure    Your heart beats very rapidly, very slowly, or irregularly (palpitations)  Date Last Reviewed: 12/1/2016 2000-2017 [employer]. [address] [place] [address], [place] [address]. All rights reserved. This information is not intended as a substitute for professional medical care. Always follow your healthcare professional's instructions.

## 2018-03-06 NOTE — ED AVS SNAPSHOT
Emergency Department    [address] [place] [address]    Phone:  [phone]    Fax:  [phone]                                       Macie Maya   MRN: [id number]    Department:   Emergency Department   Date of Visit:  3/6/2018           After Visit Summary Signature Page     I have received my discharge instructions, and my questions have been answered. I have discussed any challenges I see with this plan with the nurse or doctor.    ..........................................................................................................................................  Patient/Patient Representative Signature      ..........................................................................................................................................  Patient Representative Print Name and Relationship to Patient    ..................................................               ................................................  Date                                            Time    ..........................................................................................................................................  Reviewed by Signature/Title    ...................................................              ..............................................  Date                                                            Time

## 2018-03-06 NOTE — ED PROVIDER NOTES
"  History     Chief Complaint:  Loss of Consciousness       The history is provided by the patient.      Macie Maya is a 67 year old female with a history of multiple syncopal episodes and glaucoma who presents to the ED for evaluation of loss of consciousness. This morning, the patient was nervous for her procedure. She did well during the procedure to her right eye. Just as they finished and were moving to her left eye, she had a syncopal episode. She did not fall out of the chair or hit her head. Afterwards, she was aware of the situation. She was sent here for evaluation.     Here in the ED, the patient denies chest pain, shortness of breath, or tachycardia prior to her syncopy. She does report a history of syncopy, states \"I am a fainter.\" She reports typically fainting after stressful events, such as drawing blood.       Allergies:  Gluten Meal    Medications:    Prednisolone   Brimonidine    Past Medical History:    Multiple syncopal episodes  Glaucoma    Past Surgical History:    Brain-correct AVM  Eye  Iridotomy, right    Family History:    No past pertinent family history.    Social History:  Presents with her .   Negative for tobacco use.  Negative for smokeless tobacco.   Negative for alcohol use.  Marital Status:   [2]     Review of Systems   Respiratory: Negative for shortness of breath.    Cardiovascular: Negative for chest pain.   Neurological: Positive for syncope. Negative for headaches.   All other systems reviewed and are negative.    Physical Exam   First Vitals:  Patient Vitals for the past 24 hrs:   BP Temp Temp src Pulse Heart Rate Resp SpO2 Height Weight   03/06/18 1300 130/76 - - 64 - - - - -   03/06/18 1146 122/69 97.9  F (36.6  C) Oral 62 62 16 99 % 1.626 m (5' 4\") 61.2 kg (135 lb)       Physical Exam  General: Appears well-developed and well-nourished.   Head: No signs of trauma.   Mouth/Throat: Oropharynx is clear and moist.   CV: Normal rate and regular rhythm.  "   Resp: Effort normal and breath sounds normal. No respiratory distress.   GI: Soft. There is no tenderness.  No rebound or guarding.  Normal bowel sounds.   MSK: Normal range of motion. no edema. No Calf tenderness.  Neuro: The patient is alert and oriented to person, place, and time.  Strength in upper/lower extremities normal and symmetrical.   Sensation normal. Speech normal.  GCS 15  Skin: Skin is warm and dry. No rash noted.   Psych: normal mood and affect. behavior is normal.       Emergency Department Course   ECG:  Indication: Loss of consciousness  Time: 1151  Vent. Rate 70 bpm. NE interval 160. QRS duration 80. QT/QTc 420/453. P-R-T axis 56 44 61. Normal sinus rhythm. Possible left atrial enlargement. Borderline ECG. Read time: 1158.     Laboratory:  1155 Troponin: <0.015    CBC: WBC: 6.5, HGB: 12.1, PLT: 232  CMP: Glucose 126 (H), o/w WNL (Creatinine: 0.76)    Emergency Department Course:  Nursing notes and vitals reviewed. 1259 I performed an exam of the patient as documented above. EKG obtained in the ED, see results above.     Blood drawn. This was sent to the lab for further testing, results above.    Findings and plan explained to the Patient. Patient discharged home with instructions regarding supportive care, medications, and reasons to return. The importance of close follow-up was reviewed.     I personally reviewed the laboratory results with the Patient and answered all related questions prior to discharge.     Impression & Plan    Medical Decision Making:  Macie Maya is a 67 year old female who presents after a syncopal episode.  She was in the eye center and felt herself getting lightheaded and apparently had a brief syncopal episode and was brought to the ER.  Patient states that this is very common for her with any type of medical procedure and has even had a syncopal episode when watching her pet receive a shot.  She is she states that there is nothing abnormal or different with  this episode compared to the many prior episodes that she has had.  EKG was obtained that did not show any concerning ST segment changes or arrhythmias.  Blood work was unremarkable.  Patient was feeling much better at the time of my evaluation.  I feel the patient is appropriate for discharge home with what sounded to be a vasovagal episode.  She is recommended to return should any new worsening symptoms or further concerns.      Diagnosis:    ICD-10-CM   1. Vasovagal syncope R55       Disposition:  discharged to home      I, Taylor Arreguin, am serving as a scribe on 3/6/2018 at 12:59 PM to personally document services performed by Morgan Fernandez MD based on my observations and the provider's statements to me.       Taylor Arreguin  3/6/2018    EMERGENCY DEPARTMENT       Morgan Fernandez MD  03/08/18 4369

## 2018-03-19 ENCOUNTER — OFFICE VISIT (OUTPATIENT)
Dept: OPHTHALMOLOGY | Facility: CLINIC | Age: 68
End: 2018-03-19
Attending: OPHTHALMOLOGY
Payer: COMMERCIAL

## 2018-03-19 DIAGNOSIS — H20.9 IRITIS OF LEFT EYE: ICD-10-CM

## 2018-03-19 DIAGNOSIS — H40.032 ANATOMICAL NARROW ANGLE BORDERLINE GLAUCOMA OF LEFT EYE: Primary | ICD-10-CM

## 2018-03-19 PROCEDURE — G0463 HOSPITAL OUTPT CLINIC VISIT: HCPCS | Mod: ZF

## 2018-03-19 PROCEDURE — 66761 REVISION OF IRIS: CPT | Mod: LT,ZF | Performed by: OPHTHALMOLOGY

## 2018-03-19 RX ORDER — PREDNISOLONE ACETATE 10 MG/ML
1 SUSPENSION/ DROPS OPHTHALMIC 4 TIMES DAILY
Qty: 1 BOTTLE | Refills: 0 | Status: SHIPPED | OUTPATIENT
Start: 2018-03-19 | End: 2022-08-01

## 2018-03-19 ASSESSMENT — VISUAL ACUITY
OS_CC: 20/25
CORRECTION_TYPE: GLASSES
OD_PH_CC: 20/30
OD_CC: 20/40
METHOD: SNELLEN - LINEAR

## 2018-03-19 ASSESSMENT — REFRACTION_WEARINGRX
OD_ADD: +2.50
OS_SPHERE: +5.75
OD_CYLINDER: SPHERE
OD_SPHERE: +6.75
OS_CYLINDER: SPHERE
OS_ADD: +2.50

## 2018-03-19 ASSESSMENT — TONOMETRY
OD_IOP_MMHG: 20
IOP_METHOD: TONOPEN
OS_IOP_MMHG: 18

## 2018-03-19 ASSESSMENT — CONF VISUAL FIELD
OD_NORMAL: 1
OS_NORMAL: 1
METHOD: COUNTING FINGERS

## 2018-03-19 NOTE — MR AVS SNAPSHOT
After Visit Summary   3/19/2018    Macie Maya    MRN: [id number]           Patient Information     Date Of Birth          1950        Visit Information        Provider Department      3/19/2018 10:15 AM Jose Medina MD [facility]         Follow-ups after your visit        Your next 10 appointments already scheduled     Mar 06, 2018   Procedure with Jose Medina MD   [facility] (--)    [address]  [facility] [address]   [phone]            Mar 19, 2018 10:15 AM CDT   Post-Op with Jose Medina MD   Eye Clinic ([facility])    [address]  [address]  [facility] [address] [phone]              Who to contact     Please call your clinic at [phone] to:    Ask questions about your health    Make or cancel appointments    Discuss your medicines    Learn about your test results    Speak to your doctor            Additional Information About Your Visit        MyCharQyuki Information     Nanosys is an electronic gateway that provides easy, online access to your medical records. With Nanosys, you can request a clinic appointment, read your test results, renew a prescription or communicate with your care team.     To sign up for Nanosys visit the website at [email or web address]   You will be asked to enter the access code listed below, as well as some personal information. Please follow the directions to create your username and password.     Your access code is: [email or web address]  Expires: 2018 10:52 AM     Your access code will  in 90 days. If you need help or a new code, please contact your [facility] or call [phone] for assistance.        Care EveryWhere ID     This is your Care EveryWhere ID. This could be used by other organizations to access your [facility] medical records  [id number]         Blood Pressure from Last 3  Encounters:   02/06/18 149/76    Weight from Last 3 Encounters:   02/06/18 59.9 kg (132 lb)              Today, you had the following     No orders found for display       Primary Care Provider Fax #    Physician No Ref-Primary [phone]       No address on file        Equal Access to Services     ESTUARDO BE : Hadii diego krishnamurthy aureliao Soomaali, waaxda luqadaha, qaybta kaalmada adeegyada, twan vasquez yamilettyler carrero sahra winsome . So [facility] [phone].    ATENCIÓN: Si habla español, tiene a barrera disposición servicios gratuitos de asistencia lingüística. Llame al [phone].    We comply with applicable federal civil rights laws and [place] laws. We do not discriminate on the basis of race, color, national origin, age, disability, sex, sexual orientation, or gender identity.            Thank you!     Thank you for choosing EYE CLINIC  for your care. Our goal is always to provide you with excellent care. Hearing back from our patients is one way we can continue to improve our services. Please take a few minutes to complete the written survey that you may receive in the mail after your visit with us. Thank you!             Your Updated Medication List - Protect others around you: Learn how to safely use, store and throw away your medicines at www.disposemymeds.org.          This list is accurate as of 2/9/18  9:11 AM.  Always use your most recent med list.                   Brand Name Dispense Instructions for use Diagnosis    brimonidine 0.1 % ophthalmic solution    ALPHAGAN P    1 Bottle    Place 1 drop into the right eye 2 times daily    Acute angle-closure glaucoma, right       prednisoLONE acetate 1 % ophthalmic susp    PRED FORTE    1 Bottle    Place 1 drop into the right eye 4 times daily    Acute angle-closure glaucoma, right

## 2018-03-19 NOTE — NURSING NOTE
Chief Complaints and History of Present Illnesses   Patient presents with     Post Op (Ophthalmology) Left Eye     IRIDOTOMY LEFT EYE      HPI    Affected eye(s):  Left   Symptoms:        Frequency:  Constant       Do you have eye pain now?:  No      Comments:  Had the laser done and is wondering if the laser is going to be done today  +watery but is not new  Perla LAM 10:08 AM March 19, 2018

## 2018-03-24 ASSESSMENT — EXTERNAL EXAM - RIGHT EYE: OD_EXAM: NORMAL

## 2018-03-24 ASSESSMENT — EXTERNAL EXAM - LEFT EYE: OS_EXAM: NORMAL

## 2018-03-24 ASSESSMENT — SLIT LAMP EXAM - LIDS
COMMENTS: NORMAL
COMMENTS: NORMAL

## 2019-11-06 ENCOUNTER — HEALTH MAINTENANCE LETTER (OUTPATIENT)
Age: 69
End: 2019-11-06

## 2020-02-16 ENCOUNTER — HEALTH MAINTENANCE LETTER (OUTPATIENT)
Age: 70
End: 2020-02-16

## 2020-11-29 ENCOUNTER — HEALTH MAINTENANCE LETTER (OUTPATIENT)
Age: 70
End: 2020-11-29

## 2021-04-01 ENCOUNTER — NURSE TRIAGE (OUTPATIENT)
Dept: NURSING | Facility: CLINIC | Age: 71
End: 2021-04-01

## 2021-04-02 NOTE — TELEPHONE ENCOUNTER
Patient calling reporting her left ankle is swollen. Reports the swelling started an hour ago. States it is painful and the pain spreads to her foot. Rates pain at 4/10. Denies chest pain and difficultly of breathing. Per guideline, advised patient to be seen within 4 hours. Patient states she is at the emergency department parking lot and will head inside.     Lionel Veliz RN  [facility]     COVID 19 Nurse Triage Plan/Patient Instructions    Please be aware that novel coronavirus (COVID-19) may be circulating in the community. If you develop symptoms such as fever, cough, or SOB or if you have concerns about the presence of another infection including coronavirus (COVID-19), please contact your health care provider or visit [email or web address]     Disposition/Instructions    In-Person Visit with provider recommended. Reference Visit Selection Guide.    Thank you for taking steps to prevent the spread of this virus.  o Limit your contact with others.  o Wear a simple mask to cover your cough.  o Wash your hands well and often.    Resources    [facility]: About COVID-19: [email or web address]    CDC: What to Do If You're Sick: www.cdc.gov/coronavirus/2019-ncov/about/steps-when-sick.html    CDC: Ending Home Isolation: www.cdc.gov/coronavirus/2019-ncov/hcp/disposition-in-home-patients.html     CDC: Caring for Someone: www.cdc.gov/coronavirus/2019-ncov/if-you-are-sick/care-for-someone.html     [facility]: Interim Guidance for Hospital Discharge to Home: www.health.[facility].[place]/diseases/coronavirus/hcp/hospdischarge.pdf    [facility] clinical trials (COVID-19 research studies): clinicalaffairs.[facility]/umn-clinical-trials     Below are the COVID-19 hotlines at the [place] Department of Health ([facility]). Interpreters are available.   o For health questions: Call 761-175-1940 or 1-769.514.4845 (7 a.m. to 7 p.m.)  o For questions about schools and childcare: Call  [phone] or [phone] (7 a.m. to 7 p.m.)                       Additional Information    Negative: Difficulty breathing    Negative: Entire foot is cool or blue in comparison to other side    Negative: Fever    Negative: Patient sounds very sick or weak to the triager    Negative: [1] SEVERE pain (e.g., excruciating, unable to walk) AND [2] not improved after 2 hours of pain medicine    Negative: [1] Can't move swollen joint at all AND [2] no fever    Negative: [1] Redness AND [2] painful when touched AND [3] no fever    Negative: [1] Red area or streak [2] large (> 2 in. or 5 cm)    [1] Thigh or calf pain AND [2] only 1 side AND [3] present > 1 hour    Protocols used: ANKLE SWELLING-A-AH

## 2021-04-10 ENCOUNTER — HEALTH MAINTENANCE LETTER (OUTPATIENT)
Age: 71
End: 2021-04-10

## 2021-06-02 ENCOUNTER — RECORDS - HEALTHEAST (OUTPATIENT)
Dept: ADMINISTRATIVE | Facility: CLINIC | Age: 71
End: 2021-06-02

## 2021-09-19 ENCOUNTER — HEALTH MAINTENANCE LETTER (OUTPATIENT)
Age: 71
End: 2021-09-19

## 2021-11-14 ENCOUNTER — HEALTH MAINTENANCE LETTER (OUTPATIENT)
Age: 71
End: 2021-11-14

## 2022-05-01 ENCOUNTER — HEALTH MAINTENANCE LETTER (OUTPATIENT)
Age: 72
End: 2022-05-01

## 2022-07-24 ENCOUNTER — OFFICE VISIT (OUTPATIENT)
Dept: URGENT CARE | Facility: URGENT CARE | Age: 72
End: 2022-07-24
Payer: COMMERCIAL

## 2022-07-24 VITALS
HEART RATE: 107 BPM | DIASTOLIC BLOOD PRESSURE: 89 MMHG | WEIGHT: 127.6 LBS | HEIGHT: 64 IN | OXYGEN SATURATION: 95 % | TEMPERATURE: 98.2 F | BODY MASS INDEX: 21.78 KG/M2 | SYSTOLIC BLOOD PRESSURE: 152 MMHG

## 2022-07-24 DIAGNOSIS — S10.96XA TICK BITE OF NECK, INITIAL ENCOUNTER: Primary | ICD-10-CM

## 2022-07-24 DIAGNOSIS — R21 RASH: ICD-10-CM

## 2022-07-24 DIAGNOSIS — R63.4 WEIGHT LOSS: ICD-10-CM

## 2022-07-24 DIAGNOSIS — R10.9 ABDOMINAL DISCOMFORT: ICD-10-CM
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
## 2022-07-24 DIAGNOSIS — R11.0 NAUSEA: ICD-10-CM

## 2022-07-24 LAB
ALBUMIN SERPL BCG-MCNC: 4.6 G/DL (ref 3.5–5.2)
ALP SERPL-CCNC: 51 U/L (ref 35–104)
ALT SERPL W P-5'-P-CCNC: 29 U/L (ref 10–35)
ANION GAP SERPL CALCULATED.3IONS-SCNC: 12 MMOL/L (ref 7–15)
AST SERPL W P-5'-P-CCNC: 32 U/L (ref 10–35)
BASOPHILS # BLD AUTO: 0 10E3/UL (ref 0–0.2)
BASOPHILS NFR BLD AUTO: 1 %
BILIRUB SERPL-MCNC: 0.3 MG/DL
BUN SERPL-MCNC: 17 MG/DL (ref 8–23)
CALCIUM SERPL-MCNC: 9.6 MG/DL (ref 8.8–10.2)
CHLORIDE SERPL-SCNC: 104 MMOL/L (ref 98–107)
CREAT SERPL-MCNC: 0.84 MG/DL (ref 0.51–0.95)
DEPRECATED HCO3 PLAS-SCNC: 27 MMOL/L (ref 22–29)
EOSINOPHIL # BLD AUTO: 0.1 10E3/UL (ref 0–0.7)
EOSINOPHIL NFR BLD AUTO: 1 %
ERYTHROCYTE [DISTWIDTH] IN BLOOD BY AUTOMATED COUNT: 13 % (ref 10–15)
ERYTHROCYTE [SEDIMENTATION RATE] IN BLOOD BY WESTERGREN METHOD: 18 MM/HR (ref 0–30)
GFR SERPL CREATININE-BSD FRML MDRD: 73 ML/MIN/1.73M2
GLUCOSE SERPL-MCNC: 120 MG/DL (ref 70–99)
HCT VFR BLD AUTO: 37.8 % (ref 35–47)
HGB BLD-MCNC: 12.7 G/DL (ref 11.7–15.7)
IMM GRANULOCYTES # BLD: 0 10E3/UL
IMM GRANULOCYTES NFR BLD: 0 %
LIPASE SERPL-CCNC: 61 U/L (ref 13–60)
LYMPHOCYTES # BLD AUTO: 1.8 10E3/UL (ref 0.8–5.3)
LYMPHOCYTES NFR BLD AUTO: 30 %
MCH RBC QN AUTO: 30.2 PG (ref 26.5–33)
MCHC RBC AUTO-ENTMCNC: 33.6 G/DL (ref 31.5–36.5)
MCV RBC AUTO: 90 FL (ref 78–100)
MONOCYTES # BLD AUTO: 0.6 10E3/UL (ref 0–1.3)
MONOCYTES NFR BLD AUTO: 10 %
NEUTROPHILS # BLD AUTO: 3.6 10E3/UL (ref 1.6–8.3)
NEUTROPHILS NFR BLD AUTO: 59 %
PLATELET # BLD AUTO: 280 10E3/UL (ref 150–450)
POTASSIUM SERPL-SCNC: 4.5 MMOL/L (ref 3.4–5.3)
PROT SERPL-MCNC: 7.8 G/DL (ref 6.4–8.3)
RBC # BLD AUTO: 4.21 10E6/UL (ref 3.8–5.2)
SODIUM SERPL-SCNC: 143 MMOL/L (ref 136–145)
WBC # BLD AUTO: 6.2 10E3/UL (ref 4–11)

## 2022-07-24 PROCEDURE — 85652 RBC SED RATE AUTOMATED: CPT | Performed by: PHYSICIAN ASSISTANT

## 2022-07-24 PROCEDURE — 80053 COMPREHEN METABOLIC PANEL: CPT | Performed by: PHYSICIAN ASSISTANT

## 2022-07-24 PROCEDURE — 36415 COLL VENOUS BLD VENIPUNCTURE: CPT | Performed by: PHYSICIAN ASSISTANT

## 2022-07-24 PROCEDURE — U0005 INFEC AGEN DETEC AMPLI PROBE: HCPCS | Performed by: PHYSICIAN ASSISTANT

## 2022-07-24 PROCEDURE — 83690 ASSAY OF LIPASE: CPT | Performed by: PHYSICIAN ASSISTANT

## 2022-07-24 PROCEDURE — U0003 INFECTIOUS AGENT DETECTION BY NUCLEIC ACID (DNA OR RNA); SEVERE ACUTE RESPIRATORY SYNDROME CORONAVIRUS 2 (SARS-COV-2) (CORONAVIRUS DISEASE [COVID-19]), AMPLIFIED PROBE TECHNIQUE, MAKING USE OF HIGH THROUGHPUT TECHNOLOGIES AS DESCRIBED BY CMS-2020-01-R: HCPCS | Performed by: PHYSICIAN ASSISTANT

## 2022-07-24 PROCEDURE — 86618 LYME DISEASE ANTIBODY: CPT | Performed by: PHYSICIAN ASSISTANT

## 2022-07-24 PROCEDURE — 99204 OFFICE O/P NEW MOD 45 MIN: CPT | Performed by: PHYSICIAN ASSISTANT

## 2022-07-24 PROCEDURE — 85025 COMPLETE CBC W/AUTO DIFF WBC: CPT | Performed by: PHYSICIAN ASSISTANT

## 2022-07-24 RX ORDER — DOXYCYCLINE 100 MG/1
100 CAPSULE ORAL 2 TIMES DAILY
Qty: 20 CAPSULE | Refills: 0 | Status: SHIPPED | OUTPATIENT
Start: 2022-07-24 | End: 2022-08-03

## 2022-07-24 NOTE — PROGRESS NOTES
Chief Complaint   Patient presents with     Tick Bite     Tick bite on her back that was notice yesterday.            ASSESSMENT:    ICD-10-CM    1. Tick bite of neck, initial encounter  S10.96XA doxycycline hyclate (VIBRAMYCIN) 100 MG capsule    W57.XXXA Lyme Disease Total Abs Bld with Reflex to Confirm CLIA     CBC with platelets and differential     Comprehensive metabolic panel (BMP + Alb, Alk Phos, ALT, AST, Total. Bili, TP)     Lipase     Symptomatic; Yes; 7/15/2022 COVID-19 Virus (Coronavirus) by PCR Nose     ESR: Erythrocyte sedimentation rate     Lyme Disease Total Abs Bld with Reflex to Confirm CLIA     CBC with platelets and differential     Comprehensive metabolic panel (BMP + Alb, Alk Phos, ALT, AST, Total. Bili, TP)     Lipase     ESR: Erythrocyte sedimentation rate   2. Rash  R21 doxycycline hyclate (VIBRAMYCIN) 100 MG capsule     Lyme Disease Total Abs Bld with Reflex to Confirm CLIA     CBC with platelets and differential     Comprehensive metabolic panel (BMP + Alb, Alk Phos, ALT, AST, Total. Bili, TP)     Lipase     Symptomatic; Yes; 7/15/2022 COVID-19 Virus (Coronavirus) by PCR Nose     ESR: Erythrocyte sedimentation rate     Lyme Disease Total Abs Bld with Reflex to Confirm CLIA     CBC with platelets and differential     Comprehensive metabolic panel (BMP + Alb, Alk Phos, ALT, AST, Total. Bili, TP)     Lipase     ESR: Erythrocyte sedimentation rate   3. Weight loss  R63.4 doxycycline hyclate (VIBRAMYCIN) 100 MG capsule     Lyme Disease Total Abs Bld with Reflex to Confirm CLIA     CBC with platelets and differential     Comprehensive metabolic panel (BMP + Alb, Alk Phos, ALT, AST, Total. Bili, TP)     Lipase     Symptomatic; Yes; 7/15/2022 COVID-19 Virus (Coronavirus) by PCR Nose     ESR: Erythrocyte sedimentation rate     Lyme Disease Total Abs Bld with Reflex to Confirm CLIA     CBC with platelets and differential     Comprehensive metabolic panel (BMP + Alb, Alk Phos, ALT, AST, Total. Bili,  TP)     Lipase     ESR: Erythrocyte sedimentation rate   4. Abdominal discomfort  R10.9 doxycycline hyclate (VIBRAMYCIN) 100 MG capsule     Lyme Disease Total Abs Bld with Reflex to Confirm CLIA     CBC with platelets and differential     Comprehensive metabolic panel (BMP + Alb, Alk Phos, ALT, AST, Total. Bili, TP)     Lipase     Symptomatic; Yes; 7/15/2022 COVID-19 Virus (Coronavirus) by PCR Nose     ESR: Erythrocyte sedimentation rate     Lyme Disease Total Abs Bld with Reflex to Confirm CLIA     CBC with platelets and differential     Comprehensive metabolic panel (BMP + Alb, Alk Phos, ALT, AST, Total. Bili, TP)     Lipase     ESR: Erythrocyte sedimentation rate   5. Nausea  R11.0 doxycycline hyclate (VIBRAMYCIN) 100 MG capsule     Lyme Disease Total Abs Bld with Reflex to Confirm CLIA     CBC with platelets and differential     Comprehensive metabolic panel (BMP + Alb, Alk Phos, ALT, AST, Total. Bili, TP)     Lipase     Symptomatic; Yes; 7/15/2022 COVID-19 Virus (Coronavirus) by PCR Nose     ESR: Erythrocyte sedimentation rate     Lyme Disease Total Abs Bld with Reflex to Confirm CLIA     CBC with platelets and differential     Comprehensive metabolic panel (BMP + Alb, Alk Phos, ALT, AST, Total. Bili, TP)     Lipase     ESR: Erythrocyte sedimentation rate         PLAN: Vital signs stable other than mildly elevated blood pressure.  Tick bite discovered 7/15.  Now with confluent circular rash size of a quarter.  Does not know if it was a deer tick or how long it was on.   Will treat with 10 days of Doxy.  Probiotic.  Also some weight loss and abdominal symptoms.  Labs pending.  Obtain primary care provider and follow-up in 2 weeks.  See today's orders.  Follow-up with primary clinic if not improving.  Advised about symptoms which might herald more serious problems.        Fanny [place] PA-C         SUBJECTIVE:   72 year old female who presents with tick bite right upper back 7/15/2022.  Does not know if it  "was a deer tick or not.  Believes it was likely larger than a deer tick.  Does not  how long it was on her.  Now with a larger red area around it.  The day after she developed some abdominal discomfort and diarrhea.  The diarrhea has resolved.  However she continues with diffuse abdominal discomfort, weight loss and nausea.  Unclear etiology.  Does feel it is worse after eating.  She has had about a 5 pound weight loss over the last month.  At some point during the last week or so she has had a headache here and there.  No arthralgias or myalgias.  No fever or night sweats.  No blood in her stool.  No dizziness.  No chest pain or shortness of breath.               Allergies   Allergen Reactions     Erythromycin Nausea and Vomiting     Fentanyl Nausea     Gluten Meal        Past Medical History:   Diagnosis Date     Acute glaucoma        Multiple Vitamins-Minerals (MULTIVITAMIN ADULT PO),   vitamin B complex with vitamin C (STRESS TAB) TABS tablet, Take 1 tablet by mouth daily  VITAMIN D, CHOLECALCIFEROL, PO, Take by mouth daily  prednisoLONE acetate (PRED FORTE) 1 % ophthalmic susp, Place 1 drop Into the left eye 4 times daily (Patient not taking: Reported on 7/24/2022)  prednisoLONE acetate (PRED FORTE) 1 % ophthalmic susp, Place 1 drop into the right eye 4 times daily (Patient not taking: Reported on 7/24/2022)    No current facility-administered medications on file prior to visit.      Social History     Tobacco Use     Smoking status: Never Smoker     Smokeless tobacco: Never Used   Substance Use Topics     Alcohol use: No     Drug use: No       ROS:  General: negative for fever  SKIN: + as above      Physcial Exam:  BP (!) 152/89 (BP Location: Left arm, Patient Position: Sitting, Cuff Size: Adult Regular)   Pulse 107   Temp 98.2  F (36.8  C) (Tympanic)   Ht 1.626 m (5' 4\")   Wt 57.9 kg (127 lb 9.6 oz)   SpO2 95%   BMI 21.90 kg/m      GENERAL: alert, no acute distress  EYES: conjunctival clear  RESP: " Regular breathing rate  NEURO: awake .  SKIN: Quarter sized red confluent area right upper back were tick had been.  Lungs clear to auscultation  Heart regular rate and rhythm without murmur  Abdomen-soft, nontender.  No hepatosplenomegaly.  Normal active bowel sounds.    Fanny Baldwin PA-C

## 2022-07-25 LAB
B BURGDOR IGG+IGM SER QL: 0.03
SARS-COV-2 RNA RESP QL NAA+PROBE: NEGATIVE

## 2022-08-01 ENCOUNTER — OFFICE VISIT (OUTPATIENT)
Dept: FAMILY MEDICINE | Facility: CLINIC | Age: 72
End: 2022-08-01
Payer: COMMERCIAL

## 2022-08-01 VITALS
TEMPERATURE: 98.4 F | WEIGHT: 128.2 LBS | RESPIRATION RATE: 9 BRPM | OXYGEN SATURATION: 95 % | BODY MASS INDEX: 21.89 KG/M2 | HEART RATE: 111 BPM | HEIGHT: 64 IN | SYSTOLIC BLOOD PRESSURE: 138 MMHG | DIASTOLIC BLOOD PRESSURE: 82 MMHG

## 2022-08-01 DIAGNOSIS — Z13.220 SCREENING FOR HYPERLIPIDEMIA: ICD-10-CM

## 2022-08-01 DIAGNOSIS — Z11.59 NEED FOR HEPATITIS C SCREENING TEST: ICD-10-CM

## 2022-08-01 DIAGNOSIS — Z12.31 VISIT FOR SCREENING MAMMOGRAM: ICD-10-CM
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
## 2022-08-01 DIAGNOSIS — Z12.11 SCREEN FOR COLON CANCER: ICD-10-CM

## 2022-08-01 DIAGNOSIS — S40.261A TICK BITE OF RIGHT SHOULDER, INITIAL ENCOUNTER: Primary | ICD-10-CM

## 2022-08-01 DIAGNOSIS — A69.20 ERYTHEMA MIGRANS (LYME DISEASE): ICD-10-CM

## 2022-08-01 PROBLEM — Z86.0100 HISTORY OF COLON POLYPS: Status: ACTIVE | Noted: 2020-02-29

## 2022-08-01 PROBLEM — E78.00 HYPERCHOLESTEREMIA: Status: ACTIVE | Noted: 2018-12-01

## 2022-08-01 PROBLEM — F34.1 DYSTHYMIA: Status: ACTIVE | Noted: 2018-11-25

## 2022-08-01 PROBLEM — K90.0 CELIAC DISEASE: Status: ACTIVE | Noted: 2020-02-29

## 2022-08-01 PROBLEM — M81.0 OSTEOPOROSIS WITHOUT CURRENT PATHOLOGICAL FRACTURE: Status: ACTIVE | Noted: 2018-11-25

## 2022-08-01 PROBLEM — R51.9 FREQUENT HEADACHES: Status: ACTIVE | Noted: 2018-11-25

## 2022-08-01 PROCEDURE — 99213 OFFICE O/P EST LOW 20 MIN: CPT | Performed by: FAMILY MEDICINE

## 2022-08-01 ASSESSMENT — PAIN SCALES - GENERAL: PAINLEVEL: NO PAIN (0)

## 2022-08-01 NOTE — PATIENT INSTRUCTIONS
At [facility], we strive to deliver an exceptional experience to you, every time we see you. If you receive a survey, please complete it as we do value your feedback.  If you have MyChart, you can expect to receive results automatically within 24 hours of their completion.  Your provider will send a note interpreting your results as well.   If you do not have MyChart, you should receive your results in about a week by mail.    Your care team:                            Family Medicine Internal Medicine   MD Cheko Harrison MD Shantel Branch-Fleming, MD Srinivasa Vaka, MD Katya Belousova, YAMILET Jon CNP, MD ([facility]) [facility]   Marvin Aguilar, MD Jeanette Damian MD Amelia Massimini APRN CNP Kim Thein, APRN CNP Bethany Templen, MD             Clinic hours: Monday - Thursday 7 am-6 pm; Fridays 7 am-5 pm.   Urgent care: Monday - Friday 10 am- 8 pm; Saturday and Sunday 9 am-5 pm.    Clinic: [phone]       [facility]: Monday - Thursday 8 am - 7 pm; Friday 8 am - 6 pm  [facility]: [phone]

## 2022-08-01 NOTE — PROGRESS NOTES
"  Assessment & Plan     Tick bite of right shoulder, initial encounter  Empirically treated - recheck Lyme's in 4 weeks  - Lyme Disease Total Abs Bld with Reflex to Confirm CLIA; Future    Erythema migrans (Lyme disease)  As above  - Lyme Disease Total Abs Bld with Reflex to Confirm CLIA; Future    Visit for screening mammogram  Patient to establish care closer to home    Screening for hyperlipidemia      Need for hepatitis C screening test      Screen for colon cancer    Reviewed UC Lymes test result         Return in about 4 weeks (around 8/29/2022) for lab.    Ghazala Bowman MD  [facility] VICENTE Quijano is a 72 year old, presenting for the following health issues:  Establish Care and RECHECK (Pt seen at Urgent care)      History of Present Illness       Reason for visit:  Follow up on tick bite    She eats 2-3 servings of fruits and vegetables daily.She consumes 0 sweetened beverage(s) daily.She exercises with enough effort to increase her heart rate 10 to 19 minutes per day.  She exercises with enough effort to increase her heart rate 3 or less days per week.   She is taking medications regularly.       ED/UC Followup:    Facility: [facility]  Date of visit: 07/24/2022  Reason for visit: Tick bite  Current Status: improving     7/15/22 had a tickbite and noticed significant changes so seen in UC had a negative Lyme's test and treated with doxycycline.        Review of Systems   Constitutional, HEENT, cardiovascular, pulmonary, gi and gu systems are negative, except as otherwise noted.      Objective    /82 (BP Location: Left arm, Patient Position: Sitting, Cuff Size: Adult Regular)   Pulse 111   Temp 98.4  F (36.9  C) (Oral)   Resp 9   Ht 1.626 m (5' 4\")   Wt 58.2 kg (128 lb 3.2 oz)   SpO2 95%   BMI 22.01 kg/m    Body mass index is 22.01 kg/m .  Physical Exam   GENERAL: healthy, alert and no distress  MS: no gross musculoskeletal " defects noted, no edema  SKIN: faded bullseye rash on right upper back  PSYCH: mentation appears normal, affect normal/bright              .  ..

## 2022-09-01 ENCOUNTER — LAB (OUTPATIENT)
Dept: LAB | Facility: CLINIC | Age: 72
End: 2022-09-01
Payer: COMMERCIAL

## 2022-09-01 DIAGNOSIS — S40.261A TICK BITE OF RIGHT SHOULDER, INITIAL ENCOUNTER: ICD-10-CM
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
## 2022-09-01 DIAGNOSIS — A69.20 ERYTHEMA MIGRANS (LYME DISEASE): ICD-10-CM

## 2022-09-01 LAB — B BURGDOR IGG+IGM SER QL: 0.06

## 2022-09-01 PROCEDURE — 86618 LYME DISEASE ANTIBODY: CPT

## 2022-09-01 PROCEDURE — 36415 COLL VENOUS BLD VENIPUNCTURE: CPT

## 2022-09-02 NOTE — RESULT ENCOUNTER NOTE
Ms. Maya,    Your lyme's test is negative.  This is great.    Please contact the clinic if you have additional questions.  Thank you.    Sincerely,    Ghazala Bowman MD

## 2022-11-21 ENCOUNTER — HEALTH MAINTENANCE LETTER (OUTPATIENT)
Age: 72
End: 2022-11-21

## 2023-02-03 ENCOUNTER — TELEPHONE (OUTPATIENT)
Dept: FAMILY MEDICINE | Facility: CLINIC | Age: 73
End: 2023-02-03
Payer: COMMERCIAL

## 2023-02-03 NOTE — TELEPHONE ENCOUNTER
Patient Quality Outreach    Patient is due for the following:   Breast Cancer Screening - Mammogram      Type of outreach:    Sent [employer] message.    Next Steps:  Reach out within 90 days via FashionGuidehart.    Max number of attempts reached: Yes. Will try again in 90 days if patient still on fail list.    Questions for provider review:    None     Brittnee Khan MA  Chart routed to Provider.

## 2023-05-19 ENCOUNTER — TELEPHONE (OUTPATIENT)
Dept: FAMILY MEDICINE | Facility: CLINIC | Age: 73
End: 2023-05-19
Payer: COMMERCIAL

## 2023-05-19 NOTE — LETTER
May 19, 2023    Macie Maya  [address] [address] [address]        Dear Macie,    At [facility] we care about your health and are committed to providing quality patient care.     Here is a list of Health Maintenance topics that are due now or due soon:  Health Maintenance Due   Topic Date Due    DEXA  Never done    ANNUAL REVIEW OF HM ORDERS  Never done    ADVANCE CARE PLANNING  Never done    DEPRESSION ACTION PLAN  Never done    PHQ-9  Never done    COLORECTAL CANCER SCREENING  Never done    HEPATITIS C SCREENING  Never done    LIPID  Never done    ZOSTER IMMUNIZATION (1 of 2) Never done    MAMMO SCREENING  06/21/2014    DTAP/TDAP/TD IMMUNIZATION (2 - Td or Tdap) 07/20/2019    MEDICARE ANNUAL WELLNESS VISIT  04/27/2022    COVID-19 Vaccine (5 - Pfizer series) 07/12/2022    INFLUENZA VACCINE (1) 09/01/2022        We are recommending that you:  Schedule a WELLNESS (Preventative/Physical) APPOINTMENT with your primary care provider. If you go elsewhere for your wellness appointments then please disregard this reminder    ,   Schedule a MAMMOGRAM which is due.    1 in 8 women will develop invasive breast cancer during her lifetime and it is the most common non-skin cancer in American women.  EARLY detection, new treatments, and a better understanding of the disease have increased survival rates - the 5 year survival rate in the 1960s was 63% and today it is close to 90%.    If you are under/uninsured, we recommend you contact the [facility]. They offer mammograms at no charge or on a sliding fee charge. You can schedule with them at [phone]. Please have them send us the results.      Please disregard this reminder if you have had this exam elsewhere within the last year.  It would be helpful for us to have a copy of your mammogram report in your file so that we can best coordinate your care - please contact us with when your test was done so we can  update your record.    ,   Schedule a COLONOSCOPY to assess for colon cancer (due every 10 years or 5 years in higher risk situations.)       Colon cancer is now the second leading cause of cancer-related deaths in the [place] for both men and women and there are over 130,000 new cases and 50,000 deaths per year from colon cancer.  Colonoscopies can prevent 90-95% of these deaths.  Problem lesions can be removed before they ever become cancer.  This test is not only looking for cancer, but also getting rid of precancerious lesions.    If you are under/uninsured, we recommend you contact the [employer] program. [employer] is a free colorectal cancer screening program that provides colonoscopies for eligible under/uninsured [place] men and women. If you are interested in receiving a free colonoscopy, please call [employer] at [phone] (mention code ScopesWeb) to see if you re eligible.     If you do not wish to do a colonoscopy or cannot afford to do one, at this time, there is another option. It is called a FIT test or Fecal Immunochemical Occult Blood Test (take home stool sample kit).  It does not replace the colonoscopy for colorectal cancer screening, but it can detect hidden bleeding in the lower colon.  It does need to be repeated every year and if a positive result is obtained, you would be referred for a colonoscopy.    If you have completed either one of these tests at another facility, please call/respond to this message with the details of when and where the tests were done and if they were normal or not. Or have the records sent to our clinic so that we can best coordinate your care., and   Complete the attached Questionnaire:  You are due to complete the attached PHQ questionnaire. Please complete as soon as you are able.    To schedule an appointment or discuss this further, you may contact us by phone at the [facility] at [phone] or online through the [email or web address]  [email or web address] @ [email or web address]    Thank you for trusting [facility] and we appreciate the opportunity to serve you.  We look forward to supporting your healthcare needs in the future.    Your partners in health,      Quality Committee at [facility]

## 2023-05-19 NOTE — TELEPHONE ENCOUNTER
Patient Quality Outreach    Patient is due for the following:   Colon Cancer Screening  Breast Cancer Screening - Mammogram  Depression  -  PHQ-9 needed and PHQ-A needed  Physical Annual Wellness Visit      Topic Date Due     Zoster (Shingles) Vaccine (1 of 2) Never done     Diptheria Tetanus Pertussis (DTAP/TDAP/TD) Vaccine (2 - Td or Tdap) 07/20/2019     COVID-19 Vaccine (5 - Pfizer series) 07/12/2022     Flu Vaccine (1) 09/01/2022       Next Steps:   Schedule a Annual Wellness Visit    Type of outreach:    Sent letter.    Next Steps:  Reach out within 90 days via Phone.    Max number of attempts reached: No. Will try again in 90 days if patient still on fail list.    Questions for provider review:    None           Isak Shore MA

## 2023-11-26 ENCOUNTER — HEALTH MAINTENANCE LETTER (OUTPATIENT)
Age: 73
End: 2023-11-26

## 2024-11-26 ENCOUNTER — HOSPITAL ENCOUNTER (OUTPATIENT)
Dept: MAMMOGRAPHY | Facility: CLINIC | Age: 74
Discharge: HOME | End: 2024-11-26
Attending: FAMILY MEDICINE
Payer: COMMERCIAL

## 2024-11-26 DIAGNOSIS — Z12.31 SCREENING MAMMOGRAM, ENCOUNTER FOR: ICD-10-CM

## 2024-11-26 PROCEDURE — 77067 SCR MAMMO BI INCL CAD: CPT

## 2024-11-26 PROCEDURE — 77063 BREAST TOMOSYNTHESIS BI: CPT

## 2025-01-04 ENCOUNTER — HEALTH MAINTENANCE LETTER (OUTPATIENT)
Age: 75
End: 2025-01-04

## (undated) RX ORDER — PILOCARPINE HYDROCHLORIDE 10 MG/ML
SOLUTION/ DROPS OPHTHALMIC
Status: DISPENSED
Start: 2018-03-06

## (undated) RX ORDER — ONDANSETRON 4 MG/1
TABLET, ORALLY DISINTEGRATING ORAL
Status: DISPENSED
Start: 2018-03-06

## (undated) RX ORDER — BRIMONIDINE TARTRATE 2 MG/ML
SOLUTION/ DROPS OPHTHALMIC
Status: DISPENSED
Start: 2018-02-06